# Patient Record
Sex: FEMALE | Race: WHITE | NOT HISPANIC OR LATINO | Employment: FULL TIME | URBAN - METROPOLITAN AREA
[De-identification: names, ages, dates, MRNs, and addresses within clinical notes are randomized per-mention and may not be internally consistent; named-entity substitution may affect disease eponyms.]

---

## 2023-03-07 ENCOUNTER — OFFICE VISIT (OUTPATIENT)
Dept: PRIMARY CARE CLINIC | Facility: CLINIC | Age: 58
End: 2023-03-07
Payer: COMMERCIAL

## 2023-03-07 VITALS
OXYGEN SATURATION: 100 % | TEMPERATURE: 98 F | DIASTOLIC BLOOD PRESSURE: 83 MMHG | WEIGHT: 126.63 LBS | BODY MASS INDEX: 23.91 KG/M2 | RESPIRATION RATE: 17 BRPM | HEIGHT: 61 IN | SYSTOLIC BLOOD PRESSURE: 131 MMHG | HEART RATE: 65 BPM

## 2023-03-07 DIAGNOSIS — C20 RECTAL CANCER METASTASIZED TO LUNG: ICD-10-CM

## 2023-03-07 DIAGNOSIS — Z00.00 PREVENTATIVE HEALTH CARE: ICD-10-CM

## 2023-03-07 DIAGNOSIS — C78.00 RECTAL CANCER METASTASIZED TO LUNG: ICD-10-CM

## 2023-03-07 DIAGNOSIS — Z00.00 ENCOUNTER FOR MEDICAL EXAMINATION TO ESTABLISH CARE: Primary | ICD-10-CM

## 2023-03-07 PROBLEM — C78.7 RECTAL CANCER METASTASIZED TO LIVER: Status: ACTIVE | Noted: 2023-03-07

## 2023-03-07 PROBLEM — Z92.3 PERSONAL HISTORY OF IRRADIATION: Status: ACTIVE | Noted: 2021-05-13

## 2023-03-07 PROBLEM — Z86.19 HISTORY OF HERPES ZOSTER: Status: ACTIVE | Noted: 2021-04-30

## 2023-03-07 PROBLEM — M54.17 LUMBOSACRAL RADICULOPATHY: Status: ACTIVE | Noted: 2023-03-07

## 2023-03-07 PROBLEM — Z98.890 HISTORY OF HERNIA REPAIR: Status: ACTIVE | Noted: 2021-05-13

## 2023-03-07 PROBLEM — Z87.19 HISTORY OF HERNIA REPAIR: Status: ACTIVE | Noted: 2021-05-13

## 2023-03-07 PROBLEM — C78.02 SECONDARY MALIGNANT NEOPLASM OF LEFT LUNG: Status: ACTIVE | Noted: 2023-03-02

## 2023-03-07 PROCEDURE — 3079F DIAST BP 80-89 MM HG: CPT | Mod: CPTII,,, | Performed by: FAMILY MEDICINE

## 2023-03-07 PROCEDURE — 3075F SYST BP GE 130 - 139MM HG: CPT | Mod: CPTII,,, | Performed by: FAMILY MEDICINE

## 2023-03-07 PROCEDURE — 99203 PR OFFICE/OUTPT VISIT, NEW, LEVL III, 30-44 MIN: ICD-10-PCS | Mod: ,,, | Performed by: FAMILY MEDICINE

## 2023-03-07 PROCEDURE — 3008F PR BODY MASS INDEX (BMI) DOCUMENTED: ICD-10-PCS | Mod: CPTII,,, | Performed by: FAMILY MEDICINE

## 2023-03-07 PROCEDURE — 1159F PR MEDICATION LIST DOCUMENTED IN MEDICAL RECORD: ICD-10-PCS | Mod: CPTII,,, | Performed by: FAMILY MEDICINE

## 2023-03-07 PROCEDURE — 3079F PR MOST RECENT DIASTOLIC BLOOD PRESSURE 80-89 MM HG: ICD-10-PCS | Mod: CPTII,,, | Performed by: FAMILY MEDICINE

## 2023-03-07 PROCEDURE — 3008F BODY MASS INDEX DOCD: CPT | Mod: CPTII,,, | Performed by: FAMILY MEDICINE

## 2023-03-07 PROCEDURE — 99203 OFFICE O/P NEW LOW 30 MIN: CPT | Mod: ,,, | Performed by: FAMILY MEDICINE

## 2023-03-07 PROCEDURE — 1160F PR REVIEW ALL MEDS BY PRESCRIBER/CLIN PHARMACIST DOCUMENTED: ICD-10-PCS | Mod: CPTII,,, | Performed by: FAMILY MEDICINE

## 2023-03-07 PROCEDURE — 3075F PR MOST RECENT SYSTOLIC BLOOD PRESS GE 130-139MM HG: ICD-10-PCS | Mod: CPTII,,, | Performed by: FAMILY MEDICINE

## 2023-03-07 PROCEDURE — 1159F MED LIST DOCD IN RCRD: CPT | Mod: CPTII,,, | Performed by: FAMILY MEDICINE

## 2023-03-07 PROCEDURE — 1160F RVW MEDS BY RX/DR IN RCRD: CPT | Mod: CPTII,,, | Performed by: FAMILY MEDICINE

## 2023-03-07 RX ORDER — MULTIVITAMIN
1 TABLET ORAL DAILY
COMMUNITY

## 2023-03-07 RX ORDER — GABAPENTIN 300 MG/1
300 CAPSULE ORAL NIGHTLY
COMMUNITY
Start: 2023-03-03

## 2023-03-07 NOTE — PROGRESS NOTES
Carlee Gaytan  2023  [id number]    Subjective:      Patient ID: Carlee Gaytan is a 57 y.o. female.    Chief Complaint: Establish Care    Disclaimer:  This note is prepared using voice recognition software and as such is likely to have errors despite attempts at proofreading. Please contact me for questions.     57-year-old female with history of colorectal carcinoma s/p colectomy with diverting colostomy and lung metastasis who presents to [facility].  The patient states she is followed by MD Silva and a recent lung biopsy revealed secondary malignant carcinoma.  She states that she is scheduled for partial lung resection in 1 month.  She denies history of hypertension, diabetes mellitus type 2, or other chronic lung diseases.  She admits to history of total abdominal hysterectomy and coccygeal resection.  The patient is a nonsmoker and denies any recreational drug use.  She admits to alcohol use 2-3 times per week.  She denies history of STDs.  The patient is  and has 3 children.  She currently works in an office.  She denies any current concerns.    History:  Past Medical History:   Diagnosis Date    Cancer     Colon cancer 2019     Past Surgical History:   Procedure Laterality Date     SECTION      COLON SURGERY      CREATION, COLOSTOMY, LAPAROSCOPIC      HYSTERECTOMY       History reviewed. No pertinent family history.  Social History     Socioeconomic History    Marital status:    Tobacco Use    Smoking status: Never    Smokeless tobacco: Never   Substance and Sexual Activity    Alcohol use: Yes     Alcohol/week: 3.0 standard drinks     Types: 3 Glasses of wine per week    Drug use: Never    Sexual activity: Yes     Patient Active Problem List   Diagnosis    Rectal cancer metastasized to liver    History of hernia repair    History of herpes zoster    Lumbosacral radiculopathy    Personal history of irradiation    Rectal cancer    Secondary malignant  "neoplasm of left lung     Review of patient's allergies indicates:  No Known Allergies      Medications:  Current Outpatient Medications on File Prior to Visit   Medication Sig Dispense Refill    gabapentin (NEURONTIN) 300 MG capsule Take 300 mg by mouth every evening.      multivitamin with folic acid 400 mcg Tab Take 1 tablet by mouth once daily.       No current facility-administered medications on file prior to visit.       Review of Systems   Constitutional:  Negative for chills, diaphoresis and fever.   Eyes:  Negative for blurred vision and double vision.   Respiratory:  Negative for cough and shortness of breath.    Cardiovascular:  Negative for chest pain and leg swelling.   Gastrointestinal:  Negative for abdominal pain, diarrhea, nausea and vomiting.   Skin:  Negative for rash.   Neurological:  Negative for dizziness, tremors and headaches.     Objective:     Vitals:    03/07/23 1314   BP: 131/83   BP Location: Left arm   Patient Position: Sitting   BP Method: Small (Manual)   Pulse: 65   Resp: 17   Temp: 98.4 °F (36.9 °C)   TempSrc: Oral   SpO2: 100%   Weight: 57.4 kg (126 lb 9.6 oz)   Height: 5' 1" (1.549 m)     Physical Exam  Vitals reviewed.   Constitutional:       General: She is not in acute distress.     Appearance: Normal appearance. She is not ill-appearing, toxic-appearing or diaphoretic.   HENT:      Head: Normocephalic.   Eyes:      General:         Right eye: No discharge.         Left eye: No discharge.      Extraocular Movements: Extraocular movements intact.      Conjunctiva/sclera: Conjunctivae normal.   Cardiovascular:      Rate and Rhythm: Normal rate and regular rhythm.      Pulses: Normal pulses.      Heart sounds: Normal heart sounds. No murmur heard.    No friction rub. No gallop.   Pulmonary:      Effort: Pulmonary effort is normal. No respiratory distress.      Breath sounds: Normal breath sounds. No stridor. No wheezing, rhonchi or rales.   Musculoskeletal:         General: " Normal range of motion.      Cervical back: Normal range of motion and neck supple. No rigidity.      Right lower leg: No edema.      Left lower leg: No edema.   Skin:     General: Skin is warm and dry.      Coloration: Skin is not pale.   Neurological:      General: No focal deficit present.      Mental Status: She is alert and oriented to person, place, and time. Mental status is at baseline.   Psychiatric:         Mood and Affect: Mood normal.         Behavior: Behavior normal.         Thought Content: Thought content normal.         Judgment: Judgment normal.       Assessment:     1. Encounter for medical examination to establish care    2. Rectal cancer metastasized to liver    3. Preventative health care      Plan:   Carlee was seen today for establish care.    Diagnoses and all orders for this visit:    Encounter for medical examination to establish care  -     CBC Auto Differential; Future  -     Comprehensive Metabolic Panel; Future  -     Lipid Panel; Future  -     Hemoglobin A1C; Future  -     TSH; Future  -     Urinalysis, Reflex to Urine Culture; Future  Recommend annual eye exam and biannual dental exams.  Limit caffeine and alcohol intake.  Well balanced diet low in sugar and increased vegetable intake encouraged.  Moderate intensity exercise 30 min/day at least 5 days/Wk (total 150 min/Wk) recommended.  Wellness labs ordered.    Rectal cancer metastasized to lung  Keep follow up with oncology and pulmonology.  Last CEA 1.2 (10/2022).  Denies blood in colostomy.  Denies nausea, vomiting, or abdominal pain.    Preventative health care  -     CBC Auto Differential; Future  -     Comprehensive Metabolic Panel; Future  -     Lipid Panel; Future  -     Hemoglobin A1C; Future  -     TSH; Future  -     Urinalysis, Reflex to Urine Culture; Future    Follow up: Follow up in about 6 weeks (around 4/18/2023) for Wellness Visit.    After visit summary was printed and given to patient upon discharge today\  Carlee  Nya Gaytan was given education on their disease process and medications.

## 2023-07-20 ENCOUNTER — TELEPHONE (OUTPATIENT)
Dept: PRIMARY CARE CLINIC | Facility: CLINIC | Age: 58
End: 2023-07-20
Payer: COMMERCIAL

## 2023-07-20 NOTE — TELEPHONE ENCOUNTER
----- Message from [facility] sent at 7/20/2023  8:40 AM CDT -----  .Type:  Needs Medical Advice    Who Called:  pt      Would the patient rather a call back? Yes    Best Call Back Number:  [phone]    Additional Information:  pt was recently diagnosis with ADHD she wants to know if she brings you the test results if she can be treated by Dr. Silva

## 2023-07-20 NOTE — TELEPHONE ENCOUNTER
Spoke with patient she states she was evaluated by a credited online Dr who does ADHD evaluations but does not treat in [place] and she was wondering what would her next steps be after receiving the ADHD Dx. Informed patient that Dr Silva doesn't prescribe controlled medication that would treat it but there are other options and medication in treating the DX that doesn't involve the controlled medications she refused and asked if she can be referred to someone who treats the DX with controlled meds. I did tell patient that a psychiatrist would be the next step in treating the ADHD and she doesn't need a referral but if she prefers one Dr Silva would be willing to send one for her. She states she will call around and see who can see her the earliest and she will call if she needs a referral .

## 2023-08-13 ENCOUNTER — OFFICE VISIT (OUTPATIENT)
Dept: URGENT CARE | Facility: CLINIC | Age: 58
End: 2023-08-13
Payer: COMMERCIAL

## 2023-08-13 VITALS
SYSTOLIC BLOOD PRESSURE: 132 MMHG | BODY MASS INDEX: 22.84 KG/M2 | HEART RATE: 71 BPM | HEIGHT: 61 IN | RESPIRATION RATE: 20 BRPM | DIASTOLIC BLOOD PRESSURE: 76 MMHG | WEIGHT: 121 LBS | OXYGEN SATURATION: 99 % | TEMPERATURE: 98 F

## 2023-08-13 DIAGNOSIS — N39.0 URINARY TRACT INFECTION WITHOUT HEMATURIA, SITE UNSPECIFIED: Primary | ICD-10-CM

## 2023-08-13 DIAGNOSIS — R30.0 DYSURIA: ICD-10-CM

## 2023-08-13 DIAGNOSIS — J06.9 VIRAL UPPER RESPIRATORY TRACT INFECTION: ICD-10-CM

## 2023-08-13 LAB
BILIRUB UR QL STRIP: NEGATIVE
CTP QC/QA: YES
GLUCOSE UR QL STRIP: NEGATIVE
KETONES UR QL STRIP: NEGATIVE
LEUKOCYTE ESTERASE UR QL STRIP: POSITIVE
MOLECULAR STREP A: NEGATIVE
PH, POC UA: 6.5
POC BLOOD, URINE: NEGATIVE
POC MOLECULAR INFLUENZA A AGN: NEGATIVE
POC MOLECULAR INFLUENZA B AGN: NEGATIVE
POC NITRATES, URINE: NEGATIVE
PROT UR QL STRIP: NEGATIVE
SARS-COV-2 RDRP RESP QL NAA+PROBE: NEGATIVE
SP GR UR STRIP: 1.01 (ref 1–1.03)
UROBILINOGEN UR STRIP-ACNC: 1 (ref 0.1–1.1)

## 2023-08-13 PROCEDURE — 87502 INFLUENZA DNA AMP PROBE: CPT | Mod: QW,,,

## 2023-08-13 PROCEDURE — 87635 SARS-COV-2 COVID-19 AMP PRB: CPT | Mod: QW,,,

## 2023-08-13 PROCEDURE — 87651 STREP A DNA AMP PROBE: CPT | Mod: QW,,,

## 2023-08-13 PROCEDURE — 81003 URINALYSIS AUTO W/O SCOPE: CPT | Mod: QW,,,

## 2023-08-13 PROCEDURE — 87651 POCT STREP A MOLECULAR: ICD-10-PCS | Mod: QW,,,

## 2023-08-13 PROCEDURE — 96372 THER/PROPH/DIAG INJ SC/IM: CPT | Mod: ,,,

## 2023-08-13 PROCEDURE — 99214 OFFICE O/P EST MOD 30 MIN: CPT | Mod: 25,,,

## 2023-08-13 PROCEDURE — 87088 URINE BACTERIA CULTURE: CPT

## 2023-08-13 PROCEDURE — 87635: ICD-10-PCS | Mod: QW,,,

## 2023-08-13 PROCEDURE — 81003 POCT URINALYSIS, DIPSTICK, AUTOMATED, W/O SCOPE: ICD-10-PCS | Mod: QW,,,

## 2023-08-13 PROCEDURE — 96372 PR INJECTION,THERAP/PROPH/DIAG2ST, IM OR SUBCUT: ICD-10-PCS | Mod: ,,,

## 2023-08-13 PROCEDURE — 87502 POCT INFLUENZA A/B MOLECULAR: ICD-10-PCS | Mod: QW,,,

## 2023-08-13 PROCEDURE — 99214 PR OFFICE/OUTPT VISIT, EST, LEVL IV, 30-39 MIN: ICD-10-PCS | Mod: 25,,,

## 2023-08-13 RX ORDER — NITROFURANTOIN 25; 75 MG/1; MG/1
100 CAPSULE ORAL 2 TIMES DAILY
Qty: 14 CAPSULE | Refills: 0 | Status: SHIPPED | OUTPATIENT
Start: 2023-08-13 | End: 2023-08-20

## 2023-08-13 RX ORDER — PROMETHAZINE HYDROCHLORIDE AND DEXTROMETHORPHAN HYDROBROMIDE 6.25; 15 MG/5ML; MG/5ML
5 SYRUP ORAL NIGHTLY PRN
Qty: 180 ML | Refills: 0 | Status: SHIPPED | OUTPATIENT
Start: 2023-08-13 | End: 2023-08-23

## 2023-08-13 RX ORDER — BETAMETHASONE SODIUM PHOSPHATE AND BETAMETHASONE ACETATE 3; 3 MG/ML; MG/ML
9 INJECTION, SUSPENSION INTRA-ARTICULAR; INTRALESIONAL; INTRAMUSCULAR; SOFT TISSUE
Status: COMPLETED | OUTPATIENT
Start: 2023-08-13 | End: 2023-08-13

## 2023-08-13 RX ORDER — PHENAZOPYRIDINE HYDROCHLORIDE 200 MG/1
200 TABLET, FILM COATED ORAL 3 TIMES DAILY PRN
Qty: 12 TABLET | Refills: 0 | Status: SHIPPED | OUTPATIENT
Start: 2023-08-13 | End: 2023-08-23

## 2023-08-13 RX ORDER — PHENAZOPYRIDINE HYDROCHLORIDE 200 MG/1
200 TABLET, FILM COATED ORAL 3 TIMES DAILY PRN
Qty: 12 TABLET | Refills: 0 | Status: SHIPPED | OUTPATIENT
Start: 2023-08-13 | End: 2023-08-13

## 2023-08-13 RX ADMIN — BETAMETHASONE SODIUM PHOSPHATE AND BETAMETHASONE ACETATE 9 MG: 3; 3 INJECTION, SUSPENSION INTRA-ARTICULAR; INTRALESIONAL; INTRAMUSCULAR; SOFT TISSUE at 12:08

## 2023-08-13 NOTE — PROGRESS NOTES
"Subjective:      Patient ID: Carlee Gaytan is a 57 y.o. female.    Vitals:  height is 5' 1" (1.549 m) and weight is 54.9 kg (121 lb). Her temperature is 98.1 °F (36.7 °C). Her blood pressure is 132/76 and her pulse is 71. Her respiration is 20 and oxygen saturation is 99%.     Chief Complaint: Cough (Here for 2 things:  1.  Cough, sore throat, ears stopped up, body aches, daughter has covid, but tested yesterday and today and both came back negative.  Been having symptoms a week and a half./2. Suspected bladder infection;burning with urination; urinary frequency.  Bladder symptoms since yesterday.)    Patient is a 57-year-old female that presents with 2 separate complaints.     1. Sore throat, cough, bilateral ear fullness, body aches x1 week.  States her daughter is positive for COVID however she tested herself and was negative.    2. Dysuria, urinary frequency that began yesterday.     Patient denies any SOB, CP, rash, n/v/d, abdominal pain, or neck stiffness.          HENT:  Positive for congestion and sore throat.    Respiratory:  Positive for cough.    Genitourinary:  Positive for dysuria and frequency.      Objective:     Physical Exam   Constitutional: She is oriented to person, place, and time. She appears well-developed. She is cooperative.  Non-toxic appearance. She does not appear ill. No distress.   HENT:   Ears:   Right Ear: Tympanic membrane, external ear and ear canal normal.   Left Ear: Tympanic membrane, external ear and ear canal normal.      Comments: Clear fluid in bilateral ears, clear PND noted  Nose: Congestion present. Right sinus exhibits maxillary sinus tenderness. Left sinus exhibits maxillary sinus tenderness.   Mouth/Throat: Uvula is midline. Mucous membranes are moist. Posterior oropharyngeal erythema present. No oropharyngeal exudate. Oropharynx is clear.   Eyes: Lids are normal.   Neck: Trachea normal and phonation normal. No edema present. No erythema present. "   Cardiovascular: Normal rate, regular rhythm, normal heart sounds and normal pulses.   Pulmonary/Chest: Effort normal and breath sounds normal. No respiratory distress. She has no decreased breath sounds. She has no rhonchi.   Abdominal: Normal appearance and bowel sounds are normal. She exhibits no distension. Soft. There is no abdominal tenderness. There is no left CVA tenderness and no right CVA tenderness.   Musculoskeletal: Normal range of motion.         General: No deformity. Normal range of motion.   Neurological: She is alert and oriented to person, place, and time. She exhibits normal muscle tone. Coordination normal.   Skin: Skin is warm, dry, intact, not diaphoretic and not pale.   Psychiatric: Her speech is normal and behavior is normal. Judgment and thought content normal.   Nursing note and vitals reviewed.      Assessment:     1. Urinary tract infection without hematuria, site unspecified    2. Dysuria    3. Viral upper respiratory tract infection           Previous History      Review of patient's allergies indicates:  No Known Allergies    Past Medical History:   Diagnosis Date    Cancer     Colon cancer 2019     Current Outpatient Medications   Medication Instructions    gabapentin (NEURONTIN) 300 mg, Oral, Nightly    multivitamin with folic acid 400 mcg Tab 1 tablet, Oral, Daily    nitrofurantoin, macrocrystal-monohydrate, (MACROBID) 100 MG capsule 100 mg, Oral, 2 times daily    phenazopyridine (PYRIDIUM) 200 mg, Oral, 3 times daily PRN    promethazine-dextromethorphan (PROMETHAZINE-DM) 6.25-15 mg/5 mL Syrp 5 mLs, Oral, Nightly PRN     Past Surgical History:   Procedure Laterality Date     SECTION      COLON SURGERY      CREATION, COLOSTOMY, LAPAROSCOPIC      HYSTERECTOMY       History reviewed. No pertinent family history.    Social History     Tobacco Use    Smoking status: Never    Smokeless tobacco: Never   Substance Use Topics    Alcohol use: Yes     Alcohol/week: 3.0  "standard drinks of alcohol     Types: 3 Glasses of wine per week    Drug use: Never        Physical Exam      Vital Signs Reviewed   /76   Pulse 71   Temp 98.1 °F (36.7 °C)   Resp 20   Ht 5' 1" (1.549 m)   Wt 54.9 kg (121 lb)   SpO2 99%   BMI 22.86 kg/m²        Procedures    Procedures     Labs     Results for orders placed or performed in visit on 08/13/23   POCT Urinalysis, Dipstick, Automated, W/O Scope   Result Value Ref Range    POC Blood, Urine Negative Negative    POC Bilirubin, Urine Negative Negative    POC Urobilinogen, Urine 1.0 0.1 - 1.1    POC Ketones, Urine Negative Negative    POC Protein, Urine Negative Negative    POC Nitrates, Urine Negative Negative    POC Glucose, Urine Negative Negative    pH, UA 6.5     POC Specific Gravity, Urine 1.010 1.003 - 1.029    POC Leukocytes, Urine Positive (A) Negative   POCT COVID-19 Rapid Screening   Result Value Ref Range    POC Rapid COVID Negative Negative     Acceptable Yes    POCT Influenza A/B MOLECULAR   Result Value Ref Range    POC Molecular Influenza A Ag Negative Negative, Not Reported    POC Molecular Influenza B Ag Negative Negative, Not Reported     Acceptable Yes    POCT Strep A, Molecular   Result Value Ref Range    Molecular Strep A, POC Negative Negative     Acceptable Yes       Plan:       Urinary tract infection without hematuria, site unspecified  -     Urine culture  -     nitrofurantoin, macrocrystal-monohydrate, (MACROBID) 100 MG capsule; Take 1 capsule (100 mg total) by mouth 2 (two) times daily. for 7 days  Dispense: 14 capsule; Refill: 0    Dysuria  -     POCT Urinalysis, Dipstick, Automated, W/O Scope  -     phenazopyridine (PYRIDIUM) 200 MG tablet; Take 1 tablet (200 mg total) by mouth 3 (three) times daily as needed for Pain.  Dispense: 12 tablet; Refill: 0    Viral upper respiratory tract infection  -     POCT COVID-19 Rapid Screening  -     POCT Influenza A/B MOLECULAR  -     " POCT Strep A, Molecular  -     betamethasone acetate-betamethasone sodium phosphate injection 9 mg  -     promethazine-dextromethorphan (PROMETHAZINE-DM) 6.25-15 mg/5 mL Syrp; Take 5 mLs by mouth nightly as needed (cough).  Dispense: 180 mL; Refill: 0      UTI: Drink plenty of fluids.  Take antibiotics until completely gone. Take with food to avoid upset stomach.     Pyridium as needed for dysuria/frequency.  This will turn your urine orange, this is nothing to be worried about and a normal side effect of medicine.    We will call you in 3-5 days with your urine culture results.    Go to ER/Return for any high fever, vomiting, severe abdominal pain, or other concerns.     URI: Take OTC cough/cold/congestion medication such as Dayquil/Nyquil.  May also take antihistamine such as Claritin/Zyrtec/Allegra.  Cepacol sore throat lozenges if needed.     Drink plenty of fluids.  Rest.

## 2023-08-13 NOTE — PATIENT INSTRUCTIONS
UTI: Drink plenty of fluids.  Take antibiotics until completely gone. Take with food to avoid upset stomach.     Pyridium as needed for dysuria/frequency.  This will turn your urine orange, this is nothing to be worried about and a normal side effect of medicine.    We will call you in 3-5 days with your urine culture results.    Go to ER/Return for any high fever, vomiting, severe abdominal pain, or other concerns.     URI: Take OTC cough/cold/congestion medication such as Dayquil/Nyquil.  May also take antihistamine such as Claritin/Zyrtec/Allegra.  Cepacol sore throat lozenges if needed.     Drink plenty of fluids.  Rest.

## 2023-08-15 LAB — BACTERIA UR CULT: ABNORMAL

## 2023-08-15 NOTE — PROGRESS NOTES
Culture final results reviewed, to continue and finish antibiotic coverage for urinary tract infection.